# Patient Record
Sex: MALE | Race: WHITE | Employment: FULL TIME | URBAN - METROPOLITAN AREA
[De-identification: names, ages, dates, MRNs, and addresses within clinical notes are randomized per-mention and may not be internally consistent; named-entity substitution may affect disease eponyms.]

---

## 2020-12-22 ENCOUNTER — OFFICE VISIT (OUTPATIENT)
Dept: PRIMARY CARE CLINIC | Age: 25
End: 2020-12-22
Payer: COMMERCIAL

## 2020-12-22 VITALS
RESPIRATION RATE: 14 BRPM | HEIGHT: 69 IN | OXYGEN SATURATION: 99 % | DIASTOLIC BLOOD PRESSURE: 72 MMHG | TEMPERATURE: 98.2 F | HEART RATE: 79 BPM | SYSTOLIC BLOOD PRESSURE: 124 MMHG | WEIGHT: 159.4 LBS | BODY MASS INDEX: 23.61 KG/M2

## 2020-12-22 DIAGNOSIS — R30.0 DYSURIA: Primary | ICD-10-CM

## 2020-12-22 DIAGNOSIS — R10.9 ACUTE RIGHT FLANK PAIN: ICD-10-CM

## 2020-12-22 DIAGNOSIS — Z76.89 ENCOUNTER TO ESTABLISH CARE: ICD-10-CM

## 2020-12-22 DIAGNOSIS — R30.0 DYSURIA: ICD-10-CM

## 2020-12-22 LAB
APPEARANCE UR: CLEAR
BACTERIA URNS QL MICRO: NEGATIVE /HPF
BILIRUB UR QL: NEGATIVE
COLOR UR: ABNORMAL
COMMENT, HOLDF: NORMAL
EPITH CASTS URNS QL MICRO: ABNORMAL /LPF
GLUCOSE UR STRIP.AUTO-MCNC: NEGATIVE MG/DL
HGB UR QL STRIP: NEGATIVE
HYALINE CASTS URNS QL MICRO: ABNORMAL /LPF (ref 0–5)
KETONES UR QL STRIP.AUTO: NEGATIVE MG/DL
LEUKOCYTE ESTERASE UR QL STRIP.AUTO: ABNORMAL
NITRITE UR QL STRIP.AUTO: NEGATIVE
PH UR STRIP: 8 [PH] (ref 5–8)
PROT UR STRIP-MCNC: NEGATIVE MG/DL
RBC #/AREA URNS HPF: ABNORMAL /HPF (ref 0–5)
SAMPLES BEING HELD,HOLD: NORMAL
SP GR UR REFRACTOMETRY: 1.01 (ref 1–1.03)
UA: UC IF INDICATED,UAUC: ABNORMAL
UROBILINOGEN UR QL STRIP.AUTO: 0.2 EU/DL (ref 0.2–1)
WBC URNS QL MICRO: ABNORMAL /HPF (ref 0–4)

## 2020-12-22 PROCEDURE — 99204 OFFICE O/P NEW MOD 45 MIN: CPT | Performed by: NURSE PRACTITIONER

## 2020-12-22 RX ORDER — OMEGA-3 FATTY ACIDS/FISH OIL 300-500 MG
CAPSULE ORAL
COMMUNITY

## 2020-12-22 RX ORDER — TAMSULOSIN HYDROCHLORIDE 0.4 MG/1
0.4 CAPSULE ORAL DAILY
Qty: 30 CAP | Refills: 0 | Status: SHIPPED | OUTPATIENT
Start: 2020-12-22

## 2020-12-22 RX ORDER — CIPROFLOXACIN 500 MG/1
500 TABLET ORAL 2 TIMES DAILY
Qty: 14 TAB | Refills: 0 | Status: SHIPPED | OUTPATIENT
Start: 2020-12-22 | End: 2020-12-29

## 2020-12-22 RX ORDER — LANOLIN ALCOHOL/MO/W.PET/CERES
1000 CREAM (GRAM) TOPICAL DAILY
COMMUNITY

## 2020-12-22 RX ORDER — BISMUTH SUBSALICYLATE 262 MG
1 TABLET,CHEWABLE ORAL DAILY
COMMUNITY

## 2020-12-22 NOTE — PROGRESS NOTES
This note will not be viewable in [address] Dora Reza is a 22y.o. year old male who is a new patient to me today. He has not been followed by pcp for several years. Dora Reza is a  22 y.o. male presents for visit. Chief Complaint   Patient presents with    Establish Care    Kidney Stone     hx of kidney stones    Urinary Pain       Presents to establish care and with report of burning pain with voiding. Concerned he may have a kidney stone. Reports passing a large kidney stone 2 months ago. He is in a monogamous relationship with girlfriend for past 5 years. Reports history of chlamydia in college that was treated. Urinary Pain   The history is provided by the patient. This is a new problem. The current episode started more than 1 week ago (7 days). The problem occurs every urination. The problem has been gradually worsening. The quality of the pain is described as burning. The pain is moderate. There has been no fever. He is sexually active. Associated symptoms include hesitancy and back pain (lower back). Pertinent negatives include no chills, no sweats, no nausea, no vomiting, no discharge, no hematuria, no urgency, no flank pain, no penile discharge and no abdominal pain. He has tried increased fluids and NSAIDs for the symptoms. His past medical history is significant for kidney stones. His past medical history does not include recurrent UTIs. Review of Systems   Constitutional: Negative for chills, fever and malaise/fatigue. HENT: Negative for congestion and sore throat. Respiratory: Negative for cough and shortness of breath. Cardiovascular: Negative for chest pain. Gastrointestinal: Negative for abdominal pain, diarrhea, heartburn, nausea and vomiting. Genitourinary: Positive for dysuria and hesitancy. Negative for flank pain, hematuria, penile discharge and urgency. Musculoskeletal: Positive for back pain (lower back). Negative for myalgias. Neurological: Negative for headaches. Past Medical History:   Diagnosis Date    Kidney stones     \"2 months ago OCT 2020)      No past surgical history on file. Social History     Tobacco Use    Smoking status: Never Smoker    Smokeless tobacco: Never Used   Substance Use Topics    Alcohol use: Yes     Comment: rare      Social History     Social History Narrative    Not on file     Family History   Problem Relation Age of Onset    Parkinson's Disease Mother     Diabetes Father       Prior to Admission medications    Medication Sig Start Date End Date Taking? Authorizing Provider   cyanocobalamin 1,000 mcg tablet Take 1,000 mcg by mouth daily. Yes Provider, Historical   multivitamin (ONE A DAY) tablet Take 1 Tab by mouth daily. Yes Provider, Historical   fish oil-omega-3 fatty acids (Fish Oil) 300-500 mg cap Take  by mouth. Yes Provider, Historical   ciprofloxacin HCl (CIPRO) 500 mg tablet Take 1 Tab by mouth two (2) times a day for 7 days. 12/22/20 12/29/20 Yes Luke Puckett NP   tamsulosin (Flomax) 0.4 mg capsule Take 1 Cap by mouth daily. Indications: stones in the urinary tract 12/22/20  Yes Dayami Philip NP      No Known Allergies       Visit Vitals  /72 (BP 1 Location: Right arm, BP Patient Position: Sitting)   Pulse 79   Temp 98.2 °F (36.8 °C) (Oral)   Resp 14   Ht 5' 9\" (1.753 m)   Wt 159 lb 6.4 oz (72.3 kg)   SpO2 99%   BMI 23.54 kg/m²     Physical Exam  Vitals signs and nursing note reviewed. Constitutional:       General: He is not in acute distress. Appearance: He is well-developed. HENT:      Head: Normocephalic and atraumatic. Right Ear: External ear normal.      Left Ear: External ear normal.      Nose: Nose normal.   Eyes:      Conjunctiva/sclera: Conjunctivae normal.   Cardiovascular:      Rate and Rhythm: Normal rate and regular rhythm. Heart sounds: Normal heart sounds.    Pulmonary:      Effort: Pulmonary effort is normal. Breath sounds: Normal breath sounds. No wheezing. Abdominal:      General: Abdomen is flat. Bowel sounds are normal.      Palpations: Abdomen is soft. Tenderness: There is no abdominal tenderness. There is right CVA tenderness (mild). Skin:     General: Skin is warm and dry. Neurological:      Mental Status: He is alert and oriented to person, place, and time. Psychiatric:         Speech: Speech normal.         Behavior: Behavior normal.               No results found for this or any previous visit (from the past 24 hour(s)). ASSESSMENT AND PLAN:  There are no active problems to display for this patient. ICD-10-CM ICD-9-CM   1. Dysuria  R30.0 788.1   2. Acute right flank pain  R10.9 789.09     338.19   3. Encounter to establish care  Z76.89 V65.8     Orders Placed This Encounter    US RETROPERITONEUM COMP     Standing Status:   Future     Standing Expiration Date:   1/22/2022     Order Specific Question:   Reason for Exam     Answer:   recent kidney stone- similar symptoms    CT/NG/T.VAGINALIS AMPLIFICATION     Standing Status:   Future     Standing Expiration Date:   12/22/2021     Order Specific Question:   Specimen source     Answer:   Urine [258]    URINALYSIS W/ REFLEX CULTURE     Standing Status:   Future     Standing Expiration Date:   12/22/2021    cyanocobalamin 1,000 mcg tablet     Sig: Take 1,000 mcg by mouth daily.  multivitamin (ONE A DAY) tablet     Sig: Take 1 Tab by mouth daily.  fish oil-omega-3 fatty acids (Fish Oil) 300-500 mg cap     Sig: Take  by mouth.  ciprofloxacin HCl (CIPRO) 500 mg tablet     Sig: Take 1 Tab by mouth two (2) times a day for 7 days. Dispense:  14 Tab     Refill:  0    tamsulosin (Flomax) 0.4 mg capsule     Sig: Take 1 Cap by mouth daily. Indications: stones in the urinary tract     Dispense:  30 Cap     Refill:  0       Diagnoses and all orders for this visit:    1. Dysuria  -     CT/NG/T.VAGINALIS AMPLIFICATION;  Future  -     US RETROPERITONEUM COMP; Future  -     URINALYSIS W/ REFLEX CULTURE; Future  -     ciprofloxacin HCl (CIPRO) 500 mg tablet; Take 1 Tab by mouth two (2) times a day for 7 days. -     tamsulosin (Flomax) 0.4 mg capsule; Take 1 Cap by mouth daily. Indications: stones in the urinary tract    2. Acute right flank pain  -     US RETROPERITONEUM COMP; Future  -     tamsulosin (Flomax) 0.4 mg capsule; Take 1 Cap by mouth daily. Indications: stones in the urinary tract    3. Encounter to establish care        lab results and schedule of future lab studies reviewed with patient  radiology results and schedule of future radiology studies reviewed with patient  Continue current treatment pending lab results        Follow-up and Dispositions    · Return if symptoms worsen or fail to improve. Disclaimer:  Advised him to call back or return to office if symptoms worsen/change/persist.  Discussed expected course/resolution/complications of diagnosis in detail with patient. Medication risks/benefits/costs/interactions/alternatives discussed with patient. He was given an after visit summary which includes diagnoses, current medications, & vitals. He expressed understanding with the diagnosis and plan.

## 2020-12-22 NOTE — PATIENT INSTRUCTIONS
Kidney Stone: Care Instructions  Your Care Instructions     Kidney stones are formed when salts, minerals, and other substances normally found in the urine clump together. They can be as small as grains of sand or, rarely, as large as golf balls. While the stone is traveling through the ureter, which is the tube that carries urine from the kidney to the bladder, you will probably feel pain. The pain may be mild or very severe. You may also have some blood in your urine. As soon as the stone reaches the bladder, any intense pain should go away. If a stone is too large to pass on its own, you may need a medical procedure to help you pass the stone. The doctor has checked you carefully, but problems can develop later. If you notice any problems or new symptoms, get medical treatment right away. Follow-up care is a key part of your treatment and safety. Be sure to make and go to all appointments, and call your doctor if you are having problems. It's also a good idea to know your test results and keep a list of the medicines you take. How can you care for yourself at home? · Drink plenty of fluids, enough so that your urine is light yellow or clear like water. If you have kidney, heart, or liver disease and have to limit fluids, talk with your doctor before you increase the amount of fluids you drink. · Take pain medicines exactly as directed. Call your doctor if you think you are having a problem with your medicine. ? If the doctor gave you a prescription medicine for pain, take it as prescribed. ? If you are not taking a prescription pain medicine, ask your doctor if you can take an over-the-counter medicine. Read and follow all instructions on the label. · Your doctor may ask you to strain your urine so that you can collect your kidney stone when it passes. You can use a kitchen strainer or a tea strainer to catch the stone. Store it in a plastic bag until you see your doctor again.   Preventing future kidney stones  Some changes in your diet may help prevent kidney stones. Depending on the cause of your stones, your doctor may recommend that you:  · Drink plenty of fluids, enough so that your urine is light yellow or clear like water. If you have kidney, heart, or liver disease and have to limit fluids, talk with your doctor before you increase the amount of fluids you drink. · Limit coffee, tea, and alcohol. Also avoid grapefruit juice. · Do not take more than the recommended daily dose of vitamins C and D.  · Avoid antacids such as Gaviscon, Maalox, Mylanta, or Tums. · Limit the amount of salt (sodium) in your diet. · Eat a balanced diet that is not too high in protein. · Limit foods that are high in a substance called oxalate, which can cause kidney stones. These foods include dark green vegetables, rhubarb, chocolate, wheat bran, nuts, cranberries, and beans. When should you call for help? Call your doctor now or seek immediate medical care if:    · You cannot keep down fluids.     · Your pain gets worse.     · You have a fever or chills.     · You have new or worse pain in your back just below your rib cage (the flank area).     · You have new or more blood in your urine. Watch closely for changes in your health, and be sure to contact your doctor if:    · You do not get better as expected. Where can you learn more? Go to http://www.gray.com/  Enter P214 in the search box to learn more about \"Kidney Stone: Care Instructions. \"  Current as of: April 15, 2020               Content Version: 12.6  © 0201-9258 [employer]. Care instructions adapted under license by [employer] (which disclaims liability or warranty for this information). If you have questions about a medical condition or this instruction, always ask your healthcare professional. [address] any warranty or liability for your use of this information.

## 2020-12-22 NOTE — PROGRESS NOTES
Chief Complaint   Patient presents with   [address] [place] [address] Care    Kidney Stone     hx of kidney stones    Urinary Pain       1. Have you been to the ER, urgent care clinic since your last visit? Hospitalized since your last visit? No    2. Have you seen or consulted any other health care providers outside of the [address] [place] [address] since your last visit? Include any pap smears or colon screening.  No     Health Maintenance Due   Topic Date Due    DTaP/Tdap/Td series (1 - Tdap) 06/28/2016    Flu Vaccine (1) 09/01/2020

## 2020-12-25 LAB
C TRACH RRNA SPEC QL NAA+PROBE: POSITIVE
N GONORRHOEA RRNA SPEC QL NAA+PROBE: NEGATIVE
SPECIMEN SOURCE: ABNORMAL
T VAGINALIS RRNA VAG QL NAA+PROBE: NEGATIVE

## 2020-12-28 DIAGNOSIS — A56.8 C. TRACHOMATIS INFECTION: Primary | ICD-10-CM

## 2020-12-28 RX ORDER — AZITHROMYCIN 500 MG/1
1000 TABLET, FILM COATED ORAL ONCE
Qty: 2 TAB | Refills: 0 | Status: SHIPPED | OUTPATIENT
Start: 2020-12-28 | End: 2020-12-28

## 2020-12-28 NOTE — PROGRESS NOTES
Please let patient know he tested positive for chlamydia. The treatment is Zithromax 1 gram X 1 dose. Rx sent to his pharmacy on file. Notify partners for treatment. Use condom in future. Thanks.

## 2023-05-14 RX ORDER — TAMSULOSIN HYDROCHLORIDE 0.4 MG/1
0.4 CAPSULE ORAL DAILY
COMMUNITY
Start: 2020-12-22